# Patient Record
Sex: FEMALE | Race: BLACK OR AFRICAN AMERICAN | NOT HISPANIC OR LATINO | Employment: UNEMPLOYED | URBAN - METROPOLITAN AREA
[De-identification: names, ages, dates, MRNs, and addresses within clinical notes are randomized per-mention and may not be internally consistent; named-entity substitution may affect disease eponyms.]

---

## 2024-05-17 ENCOUNTER — HOSPITAL ENCOUNTER (EMERGENCY)
Facility: HOSPITAL | Age: 39
Discharge: HOME | End: 2024-05-17
Attending: EMERGENCY MEDICINE

## 2024-05-17 ENCOUNTER — APPOINTMENT (EMERGENCY)
Dept: CT IMAGING | Facility: HOSPITAL | Age: 39
End: 2024-05-17

## 2024-05-17 VITALS
WEIGHT: 144.84 LBS | HEIGHT: 66 IN | OXYGEN SATURATION: 100 % | RESPIRATION RATE: 20 BRPM | SYSTOLIC BLOOD PRESSURE: 123 MMHG | BODY MASS INDEX: 23.28 KG/M2 | HEART RATE: 91 BPM | TEMPERATURE: 97.6 F | DIASTOLIC BLOOD PRESSURE: 66 MMHG

## 2024-05-17 DIAGNOSIS — S01.81XA FACIAL LACERATION, INITIAL ENCOUNTER: Primary | ICD-10-CM

## 2024-05-17 PROCEDURE — 90471 IMMUNIZATION ADMIN: CPT

## 2024-05-17 PROCEDURE — 99284 EMERGENCY DEPT VISIT MOD MDM: CPT | Performed by: EMERGENCY MEDICINE

## 2024-05-17 PROCEDURE — 99283 EMERGENCY DEPT VISIT LOW MDM: CPT

## 2024-05-17 PROCEDURE — 90715 TDAP VACCINE 7 YRS/> IM: CPT | Performed by: EMERGENCY MEDICINE

## 2024-05-17 PROCEDURE — 12011 RPR F/E/E/N/L/M 2.5 CM/<: CPT | Performed by: EMERGENCY MEDICINE

## 2024-05-17 PROCEDURE — 70450 CT HEAD/BRAIN W/O DYE: CPT

## 2024-05-17 RX ADMIN — TETANUS TOXOID, REDUCED DIPHTHERIA TOXOID AND ACELLULAR PERTUSSIS VACCINE, ADSORBED 0.5 ML: 5; 2.5; 8; 8; 2.5 SUSPENSION INTRAMUSCULAR at 01:04

## 2024-05-17 NOTE — Clinical Note
Phyllis Eden was seen and treated in our emergency department on 5/17/2024.                Diagnosis:     Phyllis  may return to work on return date.    She may return on this date: 05/18/2024         If you have any questions or concerns, please don't hesitate to call.      Rishi Mendez, DO    ______________________________           _______________          _______________  Hospital Representative                              Date                                Time

## 2024-05-17 NOTE — ED PROVIDER NOTES
History  Chief Complaint   Patient presents with    Facial Laceration     Pt reports falling and hitting the edge of a table corner in the bathroom. Pt denies LOC, n/v, - blood thinners. Pt has small laceration above left eyebrow      This is a 38-year-old female who presents to the emergency department after a fall.  The patient states she has been drinking tonight and slipped in the bathroom and hit her head on a table.  She denies loss consciousness.  She denies head pain or neck pain.  She denies fevers or chills.  She denies chest pain or trouble breathing.        None       History reviewed. No pertinent past medical history.    History reviewed. No pertinent surgical history.    History reviewed. No pertinent family history.  I have reviewed and agree with the history as documented.    E-Cigarette/Vaping    E-Cigarette Use Never User      E-Cigarette/Vaping Substances     Social History     Tobacco Use    Smoking status: Every Day     Types: Cigarettes    Smokeless tobacco: Never   Vaping Use    Vaping status: Never Used   Substance Use Topics    Alcohol use: Yes    Drug use: Yes     Types: Marijuana       Review of Systems   All other systems reviewed and are negative.      Physical Exam  Physical Exam  Constitutional:  Vital signs reviewed, patient appears nontoxic, no acute distress  Eyes: Pupils equal round reactive to light and accommodation, extraocular muscles intact  HEENT: trachea midline, no JVD, moist mucous membranes, no cranial tenderness, no C-spine tenderness  Respiratory: lung sounds clear throughout, no rhonchi, no rales  Cardiovascular: regular rate rhythm, no murmurs or rubs  Abdomen: soft, nontender, nondistended, no rebound or guarding  Back: no CVA tenderness, normal inspection  Extremities: no edema, pulses equal in all 4 extremities  Neuro: awake, alert, oriented, no focal weakness  Skin: warm, dry, 2 cm laceration that is V-shaped above the left eyebrow, no galeal involvement, no  current bleeding, no rashes noted    Vital Signs  ED Triage Vitals [05/17/24 0043]   Temperature Pulse Respirations Blood Pressure SpO2   97.6 °F (36.4 °C) 91 20 123/66 100 %      Temp Source Heart Rate Source Patient Position - Orthostatic VS BP Location FiO2 (%)   Oral Monitor Lying Left arm --      Pain Score       --           Vitals:    05/17/24 0043   BP: 123/66   Pulse: 91   Patient Position - Orthostatic VS: Lying         Visual Acuity      ED Medications  Medications   tetanus-diphtheria-acellular pertussis (BOOSTRIX) IM injection 0.5 mL (0.5 mL Intramuscular Given 5/17/24 0104)       Diagnostic Studies  Results Reviewed       None                   CT head without contrast   Final Result by Selvin Cornell MD (05/17 0148)      No acute intracranial abnormality.                  Workstation performed: [id number]                    Procedures  Universal Protocol:  Consent: Verbal consent obtained.  Consent given by: patient  Laceration repair    Date/Time: 5/17/2024 2:16 AM    Performed by: Rishi Mendez DO  Authorized by: Rishi Mendez DO  Body area: head/neck  Location details: left eyebrow  Laceration length: 2 cm  Anesthesia: local infiltration    Anesthesia:  Local Anesthetic: lidocaine 1% with epinephrine  Anesthetic total: 5 mL      Procedure Details:  Irrigation solution: saline  Irrigation method: syringe  Skin closure: 5-0 nylon  Number of sutures: 3  Patient tolerance: Patient tolerated the procedure well with no immediate complications               ED Course  ED Course as of 05/17/24 0623   Fri May 17, 2024   0623 The patient had a CT of her head that showed no acute abnormality.  She had a laceration repair done at bedside with good cosmetic effect.  The patient appeared more clinically sober, and was released with her friend who is willing to help take care of her.                                             Medical Decision Making  This is a 38-year-old female who presented to the  emergency department complaining of a laceration to her head after a fall.  I considered head injury, laceration, alcohol intoxication. These and other diagnoses were considered.         Amount and/or Complexity of Data Reviewed  Radiology: ordered.    Risk  Prescription drug management.             Disposition  Final diagnoses:   Facial laceration, initial encounter     Time reflects when diagnosis was documented in both MDM as applicable and the Disposition within this note       Time User Action Codes Description Comment    5/17/2024  2:16 AM Rishi Mendez Add [S01.81XA] Facial laceration, initial encounter           ED Disposition       ED Disposition   Discharge    Condition   Stable    Date/Time   Fri May 17, 2024  2:16 AM    Comment   Phyllis Eden discharge to home/self care.                   Follow-up Information       Follow up With Specialties Details Why Contact Info Additional Information    [facility] Emergency Department Emergency Medicine  If symptoms worsen [address] [place] [id number] [phone] [facility] Emergency Department, [address] [address]    [facility] Schedule an appointment as soon as possible for a visit in 1 week For suture removal [address] [place] [id number] [phone] [facility], [address] [place] [address], [address] [phone]            There are no discharge medications for this patient.      No discharge procedures on file.    PDMP Review       None            ED Provider  Electronically Signed by             Rishi Mendez DO  05/17/24 0623

## 2024-06-10 ENCOUNTER — OFFICE VISIT (OUTPATIENT)
Dept: URGENT CARE | Facility: CLINIC | Age: 39
End: 2024-06-10

## 2024-06-10 VITALS — RESPIRATION RATE: 16 BRPM | DIASTOLIC BLOOD PRESSURE: 79 MMHG | SYSTOLIC BLOOD PRESSURE: 117 MMHG | HEART RATE: 79 BPM

## 2024-06-10 DIAGNOSIS — Z48.02 ENCOUNTER FOR REMOVAL OF SUTURES: Primary | ICD-10-CM

## 2024-06-10 PROCEDURE — 99213 OFFICE O/P EST LOW 20 MIN: CPT | Performed by: FAMILY MEDICINE

## 2024-06-10 NOTE — PROGRESS NOTES
[facility]        NAME: Phyllis Eden is a 38 y.o. female  : 1985    MRN: [id number]  DATE: Nadeen 10, 2024  TIME: 5:25 PM    Assessment and Plan   Encounter for removal of sutures [Z48.02]  1. Encounter for removal of sutures  Suture removal            Patient Instructions     3 sutures/staples removed in the office today. Laceration appeared well healed. Follow up with PCP in 3-5 days if needed. Proceed to ER if symptoms worsen.    Chief Complaint     Chief Complaint   Patient presents with   • Suture / Staple Removal     Sutures above L eyebrow placed 3 1/2 weeks ago in ER after passing out.          History of Present Illness     Phyllis Eden is a 38 y.o. female presenting to the office today fore suture/staple removal. Laceration occurred on 2024. She was seen in the ED and 3 sutures/staples were placed. There are no new no complaints today. No reports of erythema, discharge or swelling.     Review of Systems     Review of Systems   Constitutional:  Negative for chills and fever.   HENT:  Negative for congestion and sore throat.    Eyes:  Negative for discharge and itching.   Respiratory:  Negative for cough and wheezing.    Gastrointestinal:  Negative for abdominal pain, nausea and vomiting.   Genitourinary:  Negative for dysuria and flank pain.   Musculoskeletal:  Negative for arthralgias, myalgias and neck pain.   Skin:  Negative for rash and wound.   Allergic/Immunologic: Negative for food allergies.   Neurological:  Negative for light-headedness and headaches.   Psychiatric/Behavioral:  Negative for agitation. The patient is not nervous/anxious.        Current Medications     No current outpatient medications on file.    Current Allergies     Allergies as of 06/10/2024   • (No Known Allergies)            The following portions of the patient's history were reviewed and updated as appropriate: allergies, current medications, past family history, past medical history, past social  history, past surgical history and problem list.     No past medical history on file.    No past surgical history on file.    No family history on file.    Medications have been verified.    Objective     /79   Pulse 79   Resp 16   LMP 04/15/2024   Patient's last menstrual period was 04/15/2024.     Physical Exam     Physical Exam  Vitals reviewed.   Constitutional:       General: She is not in acute distress.     Appearance: Normal appearance. She is not ill-appearing.   HENT:      Head: Normocephalic and atraumatic.   Eyes:      Extraocular Movements: Extraocular movements intact.      Conjunctiva/sclera: Conjunctivae normal.   Pulmonary:      Effort: Pulmonary effort is normal.   Skin:     General: Skin is warm.   Neurological:      General: No focal deficit present.      Mental Status: She is alert.   Psychiatric:         Mood and Affect: Mood normal.         Behavior: Behavior normal.         Judgment: Judgment normal.       Suture removal    Date/Time: 6/10/2024 4:30 PM    Performed by: Carter Saba DO  Authorized by: Carter Saba DO  Universal Protocol:  Consent: Verbal consent obtained.  Risks and benefits: risks, benefits and alternatives were discussed  Consent given by: patient  Patient understanding: patient states understanding of the procedure being performed      Patient location:  Clinic  Location:     Laterality:  Left    Location:  Head/neck    Head/neck location:  Eyebrow    Eyebrow location:  L eyebrow  Procedure details:     Tools used:  Suture removal kit and scalpel    Wound appearance:  No sign(s) of infection, good wound healing and clean    Number of sutures removed:  3  Post-procedure details:     Post-removal:  No dressing applied    Patient tolerance of procedure:  Tolerated well, no immediate complications